# Patient Record
Sex: MALE | Race: OTHER | URBAN - METROPOLITAN AREA
[De-identification: names, ages, dates, MRNs, and addresses within clinical notes are randomized per-mention and may not be internally consistent; named-entity substitution may affect disease eponyms.]

---

## 2024-11-12 ENCOUNTER — HOSPITAL ENCOUNTER (OUTPATIENT)
Dept: PREADMISSION TESTING | Age: 49
Setting detail: OUTPATIENT SURGERY
Discharge: HOME | End: 2024-11-16
Payer: COMMERCIAL

## 2024-11-12 VITALS — BODY MASS INDEX: 46.65 KG/M2 | HEIGHT: 69 IN | WEIGHT: 315 LBS

## 2024-11-12 NOTE — PROGRESS NOTES
area.  When you are alert and stable, you will receive instructions and be prepared for discharge.   Instructions reviewed, patient verbalizes understanding  Bronchoscopy with Lavage 11/14/24

## 2024-11-13 ENCOUNTER — ANESTHESIA EVENT (OUTPATIENT)
Dept: ENDOSCOPY | Age: 49
End: 2024-11-13
Payer: COMMERCIAL

## 2024-11-13 NOTE — PRE-PROCEDURE INSTRUCTIONS
Nothing to eat after midnight. Y  Are you taking any blood thinners? When was the last day?N  Make sure to use Hibiclens prior to surgery.  Remove any jewelry and body piercings.Y  Do you wear glasses? If so, please bring a case to store them in.  Are you having any Covid symptoms?N  Do you have any new rashes, infections, etc. that we should be aware of?N  Do you have a ride home the day of surgery? It cannot be a cab or medical transportation.Y  Verify surgery time and what time to arrive at hospital.[id number]

## 2024-11-14 ENCOUNTER — ANESTHESIA (OUTPATIENT)
Dept: ENDOSCOPY | Age: 49
End: 2024-11-14
Payer: COMMERCIAL

## 2024-11-14 ENCOUNTER — HOSPITAL ENCOUNTER (OUTPATIENT)
Age: 49
Setting detail: OUTPATIENT SURGERY
Discharge: HOME | End: 2024-11-14
Attending: INTERNAL MEDICINE | Admitting: INTERNAL MEDICINE
Payer: COMMERCIAL

## 2024-11-14 VITALS
HEART RATE: 91 BPM | WEIGHT: 315 LBS | OXYGEN SATURATION: 93 % | SYSTOLIC BLOOD PRESSURE: 136 MMHG | DIASTOLIC BLOOD PRESSURE: 92 MMHG | BODY MASS INDEX: 46.65 KG/M2 | TEMPERATURE: 97.8 F | HEIGHT: 69 IN | RESPIRATION RATE: 24 BRPM

## 2024-11-14 DIAGNOSIS — R05.3 CHRONIC COUGH: ICD-10-CM

## 2024-11-14 LAB
COLUMNAR EPIS BAL: ABNORMAL %
EOSINOPHILS BAL: ABNORMAL % (ref 0–1)
LYMPHOCYTES, BAL: 37 % (ref 8–12)
MACROPHAGES, BAL: 39 % (ref 85–95)
RBC, BAL: 625 CELLS/UL
SEGMENTED NEUTROPHILS, BAL: 24 % (ref 0–10)
SPECIMEN TYPE: NORMAL
WBC, BAL: 165 CELLS/UL

## 2024-11-14 PROCEDURE — 87206 SMEAR FLUORESCENT/ACID STAI: CPT

## 2024-11-14 PROCEDURE — 6360000002 HC RX W HCPCS: Performed by: NURSE ANESTHETIST, CERTIFIED REGISTERED

## 2024-11-14 PROCEDURE — 2500000003 HC RX 250 WO HCPCS: Performed by: INTERNAL MEDICINE

## 2024-11-14 PROCEDURE — 6370000000 HC RX 637 (ALT 250 FOR IP): Performed by: INTERNAL MEDICINE

## 2024-11-14 PROCEDURE — 2580000003 HC RX 258: Performed by: ANESTHESIOLOGY

## 2024-11-14 PROCEDURE — 87205 SMEAR GRAM STAIN: CPT

## 2024-11-14 PROCEDURE — 7100000010 HC PHASE II RECOVERY - FIRST 15 MIN: Performed by: INTERNAL MEDICINE

## 2024-11-14 PROCEDURE — 3700000000 HC ANESTHESIA ATTENDED CARE: Performed by: INTERNAL MEDICINE

## 2024-11-14 PROCEDURE — 88312 SPECIAL STAINS GROUP 1: CPT

## 2024-11-14 PROCEDURE — 94761 N-INVAS EAR/PLS OXIMETRY MLT: CPT

## 2024-11-14 PROCEDURE — 6360000002 HC RX W HCPCS: Performed by: INTERNAL MEDICINE

## 2024-11-14 PROCEDURE — 87102 FUNGUS ISOLATION CULTURE: CPT

## 2024-11-14 PROCEDURE — 7100000011 HC PHASE II RECOVERY - ADDTL 15 MIN: Performed by: INTERNAL MEDICINE

## 2024-11-14 PROCEDURE — 88112 CYTOPATH CELL ENHANCE TECH: CPT

## 2024-11-14 PROCEDURE — 87015 SPECIMEN INFECT AGNT CONCNTJ: CPT

## 2024-11-14 PROCEDURE — 2709999900 HC NON-CHARGEABLE SUPPLY: Performed by: INTERNAL MEDICINE

## 2024-11-14 PROCEDURE — 89050 BODY FLUID CELL COUNT: CPT

## 2024-11-14 PROCEDURE — 87116 MYCOBACTERIA CULTURE: CPT

## 2024-11-14 PROCEDURE — 2500000003 HC RX 250 WO HCPCS: Performed by: NURSE ANESTHETIST, CERTIFIED REGISTERED

## 2024-11-14 PROCEDURE — 2500000003 HC RX 250 WO HCPCS: Performed by: ANESTHESIOLOGY

## 2024-11-14 PROCEDURE — 3609027000 HC BRONCHOSCOPY: Performed by: INTERNAL MEDICINE

## 2024-11-14 PROCEDURE — 89051 BODY FLUID CELL COUNT: CPT

## 2024-11-14 PROCEDURE — 3700000001 HC ADD 15 MINUTES (ANESTHESIA): Performed by: INTERNAL MEDICINE

## 2024-11-14 PROCEDURE — 88305 TISSUE EXAM BY PATHOLOGIST: CPT

## 2024-11-14 PROCEDURE — 87070 CULTURE OTHR SPECIMN AEROBIC: CPT

## 2024-11-14 PROCEDURE — 94640 AIRWAY INHALATION TREATMENT: CPT

## 2024-11-14 RX ORDER — SODIUM CHLORIDE 0.9 % (FLUSH) 0.9 %
5-40 SYRINGE (ML) INJECTION PRN
Status: DISCONTINUED | OUTPATIENT
Start: 2024-11-14 | End: 2024-11-14 | Stop reason: HOSPADM

## 2024-11-14 RX ORDER — PROPOFOL 10 MG/ML
INJECTION, EMULSION INTRAVENOUS
Status: DISCONTINUED | OUTPATIENT
Start: 2024-11-14 | End: 2024-11-14 | Stop reason: SDUPTHER

## 2024-11-14 RX ORDER — LIDOCAINE HYDROCHLORIDE 40 MG/ML
4 INJECTION, SOLUTION RETROBULBAR ONCE
Status: COMPLETED | OUTPATIENT
Start: 2024-11-14 | End: 2024-11-14

## 2024-11-14 RX ORDER — KETAMINE HYDROCHLORIDE 50 MG/ML
INJECTION, SOLUTION INTRAMUSCULAR; INTRAVENOUS
Status: DISCONTINUED | OUTPATIENT
Start: 2024-11-14 | End: 2024-11-14 | Stop reason: SDUPTHER

## 2024-11-14 RX ORDER — SODIUM CHLORIDE 0.9 % (FLUSH) 0.9 %
5-40 SYRINGE (ML) INJECTION EVERY 12 HOURS SCHEDULED
Status: DISCONTINUED | OUTPATIENT
Start: 2024-11-14 | End: 2024-11-14 | Stop reason: HOSPADM

## 2024-11-14 RX ORDER — GLYCOPYRROLATE 0.2 MG/ML
INJECTION INTRAMUSCULAR; INTRAVENOUS
Status: DISCONTINUED | OUTPATIENT
Start: 2024-11-14 | End: 2024-11-14 | Stop reason: SDUPTHER

## 2024-11-14 RX ORDER — SODIUM CHLORIDE 9 MG/ML
INJECTION, SOLUTION INTRAVENOUS CONTINUOUS
Status: DISCONTINUED | OUTPATIENT
Start: 2024-11-14 | End: 2024-11-14 | Stop reason: HOSPADM

## 2024-11-14 RX ORDER — LIDOCAINE HYDROCHLORIDE 20 MG/ML
SOLUTION OROPHARYNGEAL PRN
Status: DISCONTINUED | OUTPATIENT
Start: 2024-11-14 | End: 2024-11-14 | Stop reason: ALTCHOICE

## 2024-11-14 RX ORDER — SODIUM CHLORIDE 9 MG/ML
INJECTION, SOLUTION INTRAVENOUS PRN
Status: DISCONTINUED | OUTPATIENT
Start: 2024-11-14 | End: 2024-11-14 | Stop reason: HOSPADM

## 2024-11-14 RX ORDER — MIDAZOLAM HYDROCHLORIDE 1 MG/ML
INJECTION, SOLUTION INTRAMUSCULAR; INTRAVENOUS
Status: DISCONTINUED | OUTPATIENT
Start: 2024-11-14 | End: 2024-11-14 | Stop reason: SDUPTHER

## 2024-11-14 RX ORDER — ALBUTEROL SULFATE 0.83 MG/ML
2.5 SOLUTION RESPIRATORY (INHALATION) ONCE
Status: COMPLETED | OUTPATIENT
Start: 2024-11-14 | End: 2024-11-14

## 2024-11-14 RX ORDER — LIDOCAINE HYDROCHLORIDE 10 MG/ML
1 INJECTION, SOLUTION EPIDURAL; INFILTRATION; INTRACAUDAL; PERINEURAL
Status: COMPLETED | OUTPATIENT
Start: 2024-11-14 | End: 2024-11-14

## 2024-11-14 RX ORDER — LIDOCAINE HYDROCHLORIDE 10 MG/ML
INJECTION, SOLUTION EPIDURAL; INFILTRATION; INTRACAUDAL; PERINEURAL PRN
Status: DISCONTINUED | OUTPATIENT
Start: 2024-11-14 | End: 2024-11-14 | Stop reason: ALTCHOICE

## 2024-11-14 RX ADMIN — LIDOCAINE HYDROCHLORIDE 1 ML: 10 INJECTION, SOLUTION EPIDURAL; INFILTRATION; INTRACAUDAL; PERINEURAL at 11:18

## 2024-11-14 RX ADMIN — KETAMINE HYDROCHLORIDE 50 MG: 50 INJECTION, SOLUTION INTRAMUSCULAR; INTRAVENOUS at 13:33

## 2024-11-14 RX ADMIN — GLYCOPYRROLATE 0.1 MG: 0.2 INJECTION INTRAMUSCULAR; INTRAVENOUS at 13:28

## 2024-11-14 RX ADMIN — SODIUM CHLORIDE: 9 INJECTION, SOLUTION INTRAVENOUS at 13:26

## 2024-11-14 RX ADMIN — ALBUTEROL SULFATE 2.5 MG: 2.5 SOLUTION RESPIRATORY (INHALATION) at 12:05

## 2024-11-14 RX ADMIN — MIDAZOLAM 4 MG: 1 INJECTION INTRAMUSCULAR; INTRAVENOUS at 13:31

## 2024-11-14 RX ADMIN — LIDOCAINE HYDROCHLORIDE 4 ML: 40 INJECTION, SOLUTION RETROBULBAR at 12:05

## 2024-11-14 RX ADMIN — PROPOFOL 40 MG: 10 INJECTION, EMULSION INTRAVENOUS at 13:33

## 2024-11-14 ASSESSMENT — PAIN - FUNCTIONAL ASSESSMENT
PAIN_FUNCTIONAL_ASSESSMENT: 0-10
PAIN_FUNCTIONAL_ASSESSMENT: NONE - DENIES PAIN

## 2024-11-14 ASSESSMENT — ENCOUNTER SYMPTOMS
COUGH: 1
BACK PAIN: 0
CHEST TIGHTNESS: 0
DIARRHEA: 0
VOMITING: 0
ABDOMINAL PAIN: 0
SHORTNESS OF BREATH: 0
SORE THROAT: 0
NAUSEA: 0
CONSTIPATION: 0
SHORTNESS OF BREATH: 1
WHEEZING: 0

## 2024-11-14 NOTE — ANESTHESIA POSTPROCEDURE EVALUATION
Department of Anesthesiology  Postprocedure Note    Patient: Basim Silva  MRN: [id number]  YOB: 1975  Date of evaluation: 11/14/2024    Procedure Summary       Date: 11/14/24 Room / Location: [place] [address] / [facility]    Anesthesia Start: 1328 Anesthesia Stop: 1349    Procedure: BRONCHOSCOPY WITH LAVAGE (Bronchus) Diagnosis:       Chronic cough      (Chronic cough [R05.3])    Surgeons: Avni Olivier MD Responsible Provider: Carlie Trinh MD    Anesthesia Type: General ASA Status: 3            Anesthesia Type: General    Rex Phase I:      Rex Phase II: Rex Score: 10    Anesthesia Post Evaluation    Comments: POST- ANESTHESIA EVALUATION       Pt Name: Basim Silva  MRN: [id number]  YOB: 1975  Date of evaluation: 11/14/2024  Time:  3:01 PM      BP (!) 136/92   Pulse 91   Temp 97.8 °F (36.6 °C) (Infrared)   Resp 24   Ht 1.753 m (5' 9\")   Wt (!) 149.7 kg (330 lb)   SpO2 93%   BMI 48.73 kg/m²      Consciousness Level  Awake  Cardiopulmonary Status  Stable  Pain Adequately Treated YES  Nausea / Vomiting  NO  Adequate Hydration  YES  Anesthesia Related Complications NONE      Electronically signed by Carlie Trinh MD on 11/14/2024 at 3:01 PM      No notable events documented.

## 2024-11-14 NOTE — ANESTHESIA PRE PROCEDURE
Department of Anesthesiology  Preprocedure Note       Name:  Basim Silva   Age:  49 y.o.  :  1975                                          MRN:  [id number]         Date:  2024      Surgeon: Surgeon(s):  Avni Olivier MD    Procedure: Procedure(s):  BRONCHOSCOPY WITH LAVAGE    Medications prior to admission:   Prior to Admission medications    Medication Sig Start Date End Date Taking? Authorizing Provider   dextromethorphan-guaiFENesin (MUCINEX DM)  MG per extended release tablet Take 1 tablet by mouth every 12 hours as needed for Cough or Congestion   Yes Provider, MD Gene       Current medications:    Current Facility-Administered Medications   Medication Dose Route Frequency Provider Last Rate Last Admin   • sodium chloride flush 0.9 % injection 5-40 mL  5-40 mL IntraVENous 2 times per day Heber Galindo MD       • sodium chloride flush 0.9 % injection 5-40 mL  5-40 mL IntraVENous PRN Heber Galindo MD       • 0.9 % sodium chloride infusion   IntraVENous PRN Heber Galindo MD       • 0.9 % sodium chloride infusion   IntraVENous Continuous Heber Galindo MD       • lidocaine PF 4 % injection 4 mL  4 mL Inhalation Once Avni Olivier MD       • albuterol (PROVENTIL) (2.5 MG/3ML) 0.083% nebulizer solution 2.5 mg  2.5 mg Nebulization Once Avni Olivier MD           Allergies:  No Known Allergies    Problem List:  There is no problem list on file for this patient.      Past Medical History:        Diagnosis Date   • Depression    • Fracture     right foot, neck   • H/O varicose veins     right leg   • Injury of back    • Kidney stones    • Left knee pain    • Obesity    • Pneumonia 2024   • Recovering alcoholic (HCC)        Past Surgical History:        Procedure Laterality Date   • HERNIA REPAIR     • KNEE ARTHROSCOPY Left 2019   • SKIN GRAFT      left hand   • VASECTOMY         Social History:    Social History     Tobacco Use   • Smoking status: Never   • Smokeless

## 2024-11-14 NOTE — PROCEDURES
PROCEDURE NOTE  Date: 11/14/2024   Name: Basim Silva  YOB: 1975    Procedures: Bronchoscopy with right middle lobe bronchoalveolar lavage    Bronchoscopy Procedure Note    Date of Operation: 11/14/2024    Pre-op Diagnosis: Recurrent bronchitis, cough    Post-op Diagnosis: Tortuous trachea without any significant secretions, airway inflammation    Surgeon: Avni Olivier MD    Assistants: None    Anesthesia: Monitored Local Anesthesia with Sedation    Operation: Flexible fiberoptic bronchoscopy, diagnostic right middle lobe BAL      Specimen: Right middle lobe BAL    Estimated Blood Loss: None    Complications: None    Indications and History:  The patient is a 49 y.o. male with recurrent episodes of bronchitis and cough.  The risks, benefits, complications, treatment options and expected outcomes were discussed with the patient.  The possibilities of reaction to medication, pulmonary aspiration, perforation of a viscus, bleeding, failure to diagnose a condition and creating a complication requiring transfusion or operation were discussed with the patient who freely signed the consent.      Description of Procedure:  The patient was seen in the Holding Room and the site of surgery properly noted/marked.  The patient was taken to endoscopy suite, identified as Basim Silva and the procedure verified as Flexible Fiberoptic Bronchoscopy.  A Time Out was held and the above information confirmed.     After the induction of topical nasopharyngeal anesthesia, the patient was positioned supine and the bronchoscope was passed through the right nares. The vocal cords were visualized and  1% plain lidocaine 2-3 ml was topically placed onto the cords. The cords were visualized and normal.  The patient did have evidence of sleep apnea with a redundant tissue in the upper airway. The scope was then passed into the trachea.  1% plain lidocaine 2-3 ml was used topically on the serafin.  The trachea was shaped  almost in an oblong fashion and was quite tortuous however, there was no significant secretions.  Careful inspection of the tracheal lumen was accomplished. The scope was sequentially passed into the right mainstem bronchus right upper lobe was visualized and was normal the right middle lobe was slightly higher around 11 and 12 o'clock position but there were no significant secretions.  The right lower lobe also was visualized and there were no secretions.  When the patient coughed, there was evidence of airway inflammation.    We then went to the left lung the left upper division including upper lobe and lingula were visualized.  Again very fragile airways but no significant secretions the left lower lobe was also visualized and was normal.  There were no significant secretions.    There was no endobronchial lesion seen in the either the left or right mainstem bronchus or any of the subsegments.    We then went into the right middle lobe and did 100 mL BAL with approximately 35 ml return.  Patient other than coughing tolerated the procedure very well.    The Patient was taken to the Endoscopy Recovery area in satisfactory condition.    Attestation: I performed the procedure.    Avni Olivier MD

## 2024-11-14 NOTE — DISCHARGE INSTRUCTIONS
Sedation or General Anesthesia, Adult  Care After  Refer to this sheet in the next 24 hours. These instructions provide you with information on caring for yourself after your procedure. Your caregiver may also give you more specific instructions. Your treatment has been planned according to current medical practices, but problems sometimes occur. Call your caregiver if you have any problems or questions after your procedure.   HOME CARE INSTRUCTIONS   Do not participate in any activities that require you to be alert or coordinated. Do not:  Drive.  Swim.  Ride a bicycle.  Operate heavy machinery.  Cook.  Use power tools.  Climb ladders.  Work at heights.  Take a bath.  Do not drink alcohol.  Do not make any important decisions or sign legal documents.  Stay with an adult.  The first meal following your procedure should be light and small. Avoid solid foods if you feel sick to your stomach (nauseous) or if you throw up (vomit).  Drink enough fluids to keep your urine clear or pale yellow.  Only take your usual medicines or new medicines if your caregiver approves them.  Only take over-the-counter or prescription medicines for pain, discomfort, or fever as directed by your caregiver.  Keep all follow-up appointments as directed by your caregiver.  SEEK IMMEDIATE MEDICAL CARE IF:   You are not feeling normal or behaving normally after 24 hours.  You have persistent nausea and vomiting.  You are unable to drink fluids or eat food.  You have difficulty urinating.  You have difficulty breathing or speaking.  You have blue or gray skin.  There is difficulty waking or you cannot be woken up.  You have heavy bleeding, redness, or a lot of swelling where the sedative or anesthesia entered your skin (intravenous site).  You have a rash.  MAKE SURE YOU:  Understand these instructions.  Will watch your condition.  Will get help right away if you are not doing well or get worse.  Document Released: 12/18/2006 Document Revised:  06/18/2013 Document Reviewed: 04/17/2013  [employer]® Patient Information ©2013 [employer].

## 2024-11-14 NOTE — H&P
HISTORY and PHYSICAL  [facility]       NAME:  Basim Silva  MRN: [id number]   YOB: 1975   Date: 11/14/2024   Age: 49 y.o.  Gender: male       COMPLAINT AND PRESENT HISTORY:     Basim Silva is 49 y.o.  male, here for     Procedure(s):  BRONCHOSCOPY WITH LAVAGE    Pre-Op Diagnosis Codes:      * Chronic cough [R05.3]    Patient has hx of respiratory symptoms including: persistent cough that has been ongoing for several months.     Cough has been productive with green sputum. Takes mucinex with good relief.     Denies chest discomfort associated with cough.   Patient has occasional SOB especially with cough but also with exertion.    Pt was diagnosed with pneumonia several times in last several months. Treated with oral antibiotics. Reports completing antibiotics as prescribed.   CT chest completed in October, 2024 with results below.      Denies hx of COPD/emphysema.   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 He did wear respirator then. Reports he grew up in a household with smokers.       NPO p MN. Took no medications this am with sip of water. Denies taking any blood thinning medications. Denies recent or current chest pain/pressure, palpitations, SOB, recent URI, fever or chills.       RECENT LABS, IMAGING AND TESTING     Contains abnormal data CBC auto differential  Order: [id number]  Component  Ref Range & Units 8/6/24 1730   White Blood Cells  4.0 - 11.0 X10E9/L 7.1   RBC count  4.10 - 5.70 X10E12/L 4.79   Hemoglobin  13.0 - 17.0 g/dL 15.5   Hematocrit  39 - 49 % 44.0   MCV  80 - 100 fL 92   MCH  27 - 34 pg 32.4   MCHC  32 - 36 g/dL 35.3   RDW  11.5 - 15.0 % 12.9   Platelets  150 - 450 X10E9/L 284   MPV  7 - 12 fL 8.3   % neutrophils  % 79.4   % lymphocytes  % 10.0   % monocytes  % 9.5   % eosinophils  % 0.2   % Basophils  % 0.9   Neutrophils Absolute (A)  1.5 - 6.6 X10E9/L 5.6  didn't last and we didn't have money to get more.: Never True           REVIEW OF SYSTEMS      No Known Allergies    No current facility-administered medications on file prior to encounter.     Current Outpatient Medications on File Prior to Encounter   Medication Sig Dispense Refill    dextromethorphan-guaiFENesin (MUCINEX DM)  MG per extended release tablet Take 1 tablet by mouth every 12 hours as needed for Cough or Congestion       Notation: Above medications are not currently reconciled at time of signing this H&P note, to be reconciled in pre-op per RN.     Review of Systems   Constitutional:  Negative for chills and fever.   HENT:  Negative for congestion, dental problem, hearing loss and sore throat.    Eyes:  Positive for visual disturbance (glasses, not wearing today).   Respiratory:  Positive for cough and shortness of breath. Negative for chest tightness and wheezing.    Cardiovascular:  Negative for chest pain and palpitations.   Gastrointestinal:  Negative for abdominal pain, constipation, diarrhea, nausea and vomiting.   Genitourinary:  Negative for dysuria and hematuria.   Musculoskeletal:  Positive for neck stiffness. Negative for back pain and neck pain.   Skin:  Negative for rash and wound.   Neurological:  Negative for speech difficulty.   Hematological:  Does not bruise/bleed easily.         GENERAL PHYSICAL EXAM     Vitals: Review vitals per RN flowsheet.                              Physical Exam  Constitutional:       General: He is not in acute distress.     Appearance: He is well-developed. He is obese. He is not ill-appearing.   HENT:      Head: Normocephalic and atraumatic.      Nose: Nose normal.      Mouth/Throat:      Mouth: Mucous membranes are moist.      Pharynx: Oropharynx is clear. No oropharyngeal exudate or posterior oropharyngeal erythema.   Eyes:      General: No scleral icterus.        Right eye: No discharge.         Left eye: No discharge.   Neck:      Trachea: No

## 2024-11-15 LAB
CASE NUMBER:: NORMAL
MICROORGANISM SPEC CULT: NORMAL
MICROORGANISM SPEC CULT: NORMAL
MICROORGANISM/AGENT SPEC: NORMAL
MICROORGANISM/AGENT SPEC: NORMAL
SPECIMEN DESCRIPTION: NORMAL

## 2024-11-16 LAB
MICROORGANISM SPEC CULT: NORMAL
MICROORGANISM/AGENT SPEC: NORMAL
MICROORGANISM/AGENT SPEC: NORMAL
SPECIMEN DESCRIPTION: NORMAL

## 2024-11-18 LAB
MICROORGANISM SPEC CULT: NORMAL
MICROORGANISM SPEC CULT: NORMAL
MICROORGANISM/AGENT SPEC: NORMAL
MICROORGANISM/AGENT SPEC: NORMAL
SPECIMEN DESCRIPTION: NORMAL
SPECIMEN DESCRIPTION: NORMAL
SURGICAL PATHOLOGY REPORT: NORMAL

## 2024-11-25 LAB
MICROORGANISM SPEC CULT: NORMAL
MICROORGANISM/AGENT SPEC: NORMAL
SPECIMEN DESCRIPTION: NORMAL

## 2024-11-26 LAB
MICROORGANISM SPEC CULT: NORMAL
MICROORGANISM/AGENT SPEC: NORMAL
SPECIMEN DESCRIPTION: NORMAL

## 2024-12-02 LAB
MICROORGANISM SPEC CULT: NORMAL
MICROORGANISM SPEC CULT: NORMAL
MICROORGANISM/AGENT SPEC: NORMAL
MICROORGANISM/AGENT SPEC: NORMAL
SPECIMEN DESCRIPTION: NORMAL
SPECIMEN DESCRIPTION: NORMAL

## 2024-12-22 ENCOUNTER — HOSPITAL ENCOUNTER (OUTPATIENT)
Dept: SLEEP CENTER | Age: 49
Discharge: HOME | End: 2024-12-24
Payer: COMMERCIAL

## 2024-12-22 PROCEDURE — 95810 POLYSOM 6/> YRS 4/> PARAM: CPT

## 2024-12-23 VITALS
BODY MASS INDEX: 45.1 KG/M2 | HEIGHT: 70 IN | OXYGEN SATURATION: 92 % | RESPIRATION RATE: 14 BRPM | HEART RATE: 72 BPM | WEIGHT: 315 LBS

## 2024-12-23 LAB
MICROORGANISM SPEC CULT: NORMAL
MICROORGANISM/AGENT SPEC: NORMAL
SPECIMEN DESCRIPTION: NORMAL

## 2024-12-23 ASSESSMENT — SLEEP AND FATIGUE QUESTIONNAIRES
HOW LIKELY ARE YOU TO NOD OFF OR FALL ASLEEP WHILE SITTING INACTIVE IN A PUBLIC PLACE: SLIGHT CHANCE OF DOZING
HOW LIKELY ARE YOU TO NOD OFF OR FALL ASLEEP WHILE LYING DOWN TO REST IN THE AFTERNOON WHEN CIRCUMSTANCES PERMIT: MODERATE CHANCE OF DOZING
ESS TOTAL SCORE: 14
HOW LIKELY ARE YOU TO NOD OFF OR FALL ASLEEP WHEN YOU ARE A PASSENGER IN A CAR FOR AN HOUR WITHOUT A BREAK: HIGH CHANCE OF DOZING
HOW LIKELY ARE YOU TO NOD OFF OR FALL ASLEEP WHILE SITTING QUIETLY AFTER LUNCH WITHOUT ALCOHOL: MODERATE CHANCE OF DOZING
HOW LIKELY ARE YOU TO NOD OFF OR FALL ASLEEP WHILE SITTING AND READING: MODERATE CHANCE OF DOZING
HOW LIKELY ARE YOU TO NOD OFF OR FALL ASLEEP IN A CAR, WHILE STOPPED FOR A FEW MINUTES IN TRAFFIC: SLIGHT CHANCE OF DOZING
HOW LIKELY ARE YOU TO NOD OFF OR FALL ASLEEP WHILE SITTING AND TALKING TO SOMEONE: SLIGHT CHANCE OF DOZING
HOW LIKELY ARE YOU TO NOD OFF OR FALL ASLEEP WHILE WATCHING TV: MODERATE CHANCE OF DOZING

## 2024-12-23 NOTE — PAYOR INFORMATION
Verified Demographics with Patient? No   If no why? Already verified  MSP Completed?  No    If not why? Already completed  Verified Insurance through: Already verified  COB Completed? Not required  Auth Verification #:[id number]  Liability Due: $262.25  Discount Offered: 235.41      Previous Balance: $ 262.25   Discount Offered: 20%  Site Collect Status: yes  Patient Response:pt tried to pay in full but system was not working  Financial Aid Offered? Yes Pt declined  Cards Scanned: yes

## 2024-12-30 LAB
MICROORGANISM SPEC CULT: NORMAL
MICROORGANISM/AGENT SPEC: NORMAL
SPECIMEN DESCRIPTION: NORMAL

## 2025-02-04 ENCOUNTER — HOSPITAL ENCOUNTER (OUTPATIENT)
Dept: PULMONOLOGY | Age: 50
Discharge: HOME | End: 2025-02-04
Attending: INTERNAL MEDICINE
Payer: COMMERCIAL

## 2025-02-04 DIAGNOSIS — J45.991 COUGH VARIANT ASTHMA: ICD-10-CM

## 2025-02-04 LAB
EXPIRATORY TIME-POST: NORMAL
EXPIRATORY TIME: NORMAL
FEF 25-75 %CHNG: NORMAL
FEF 25-75 POST %PRED: NORMAL
FEF 25-75% %PRED-PRE: NORMAL
FEF 25-75% PRED: NORMAL
FEF 25-75-POST: NORMAL
FEF 25-75-PRE: NORMAL
FEV1 %PRED-POST: NORMAL
FEV1 %PRED-PRE: NORMAL
FEV1 PRED: NORMAL
FEV1-POST: NORMAL
FEV1-PRE: NORMAL
FEV1/FVC %PRED-POST: NORMAL
FEV1/FVC %PRED-PRE: NORMAL
FEV1/FVC PRED: NORMAL
FEV1/FVC-POST: NORMAL
FEV1/FVC-PRE: NORMAL
FVC %PRED-POST: NORMAL
FVC %PRED-PRE: NORMAL
FVC PRED: NORMAL
FVC-POST: NORMAL
FVC-PRE: NORMAL
PEF %PRED-POST: NORMAL
PEF %PRED-PRE: NORMAL
PEF PRED: NORMAL
PEF%CHNG: NORMAL
PEF-POST: NORMAL
PEF-PRE: NORMAL

## 2025-02-04 PROCEDURE — 94070 EVALUATION OF WHEEZING: CPT

## 2025-02-04 PROCEDURE — 94060 EVALUATION OF WHEEZING: CPT

## 2025-02-04 PROCEDURE — 6360000002 HC RX W HCPCS: Performed by: INTERNAL MEDICINE

## 2025-02-04 RX ORDER — ALBUTEROL SULFATE 0.83 MG/ML
2.5 SOLUTION RESPIRATORY (INHALATION) ONCE
Status: COMPLETED | OUTPATIENT
Start: 2025-02-04 | End: 2025-02-04

## 2025-02-04 RX ADMIN — METHACHOLINE CHLORIDE 100 MG: 100 POWDER, FOR SOLUTION RESPIRATORY (INHALATION) at 11:42

## 2025-02-04 RX ADMIN — ALBUTEROL SULFATE 2.5 MG: 2.5 SOLUTION RESPIRATORY (INHALATION) at 12:05

## 2025-02-04 NOTE — PROCEDURES
Results: The FVC and FEV1 are normal premethacholine.  Methacholine was given in incremental fashion per protocol.  Even at level 5, there is no significant drop in the FEV1.    Impression: This is a negative methacholine challenge test effectively excluding obstructive lung disease as a cause of the patient's dyspnea

## 2025-02-04 NOTE — PROGRESS NOTES
BRONCHIAL / METHACHOLINE CHALLENGE CONTRAINDICATIONS REVIEWED WITH PATIENT PRIOR TO TESTING     Pt states they have none of the following ABSOLUTE CONTRAINDICATIONS    [x]  Known hypersensitivity to methacholine or other parasympathomimetic agents   [x]  FEV 1 of less than 50% normal predicted level or less than 1L (can be determined on baseline testing)   [x]  Myocardial infarction or stroke within last 3 months  [x]  Uncontrolled Hypertension (systolic BP>200 mmHg or diastolic BP>100 mmHg)   - 133/88 : current patient BP  [x]  Known or suspected aortic aneurism  [x]  Recent Eye surgery in which raised intracranial pressure would be harmful           Pt states they have none of the following RELATIVE CONTRAINDICATIONS    [x]   Moderate airflow limitation (reduced FEV1/forced vital capacity ratio) and FEV1 of less than 60% of the predicted normal value, or less than 1.5L. (can be determined on baseline testing)   [x]   Inability to perform acceptable or repeatable spirometry at baseline   [x]   Upper or lower respiratory tract infection   [x]   Current use of beta-adrenic blocking agent         VITAS PRIOR TO TEST     SpO2: 99  HR: 81  BP: 133/88  RR: 18  Work of Breathing: non labored   Breathsounds: clear / diminished

## 2025-04-08 ENCOUNTER — HOSPITAL ENCOUNTER (EMERGENCY)
Age: 50
Discharge: HOME | End: 2025-04-08
Attending: EMERGENCY MEDICINE
Payer: COMMERCIAL

## 2025-04-08 ENCOUNTER — APPOINTMENT (OUTPATIENT)
Dept: GENERAL RADIOLOGY | Age: 50
End: 2025-04-08
Payer: COMMERCIAL

## 2025-04-08 VITALS
RESPIRATION RATE: 18 BRPM | HEART RATE: 85 BPM | HEIGHT: 69 IN | DIASTOLIC BLOOD PRESSURE: 87 MMHG | WEIGHT: 315 LBS | OXYGEN SATURATION: 96 % | TEMPERATURE: 98.3 F | BODY MASS INDEX: 46.65 KG/M2 | SYSTOLIC BLOOD PRESSURE: 152 MMHG

## 2025-04-08 DIAGNOSIS — M25.561 ACUTE PAIN OF RIGHT KNEE: Primary | ICD-10-CM

## 2025-04-08 PROCEDURE — 73562 X-RAY EXAM OF KNEE 3: CPT

## 2025-04-08 PROCEDURE — 99283 EMERGENCY DEPT VISIT LOW MDM: CPT

## 2025-04-08 ASSESSMENT — PAIN DESCRIPTION - ORIENTATION: ORIENTATION: RIGHT

## 2025-04-08 ASSESSMENT — PAIN SCALES - GENERAL: PAINLEVEL_OUTOF10: 6

## 2025-04-08 ASSESSMENT — LIFESTYLE VARIABLES
HOW MANY STANDARD DRINKS CONTAINING ALCOHOL DO YOU HAVE ON A TYPICAL DAY: PATIENT DOES NOT DRINK
HOW OFTEN DO YOU HAVE A DRINK CONTAINING ALCOHOL: NEVER

## 2025-04-08 ASSESSMENT — PAIN - FUNCTIONAL ASSESSMENT
PAIN_FUNCTIONAL_ASSESSMENT: PREVENTS OR INTERFERES SOME ACTIVE ACTIVITIES AND ADLS
PAIN_FUNCTIONAL_ASSESSMENT: 0-10

## 2025-04-08 ASSESSMENT — PAIN DESCRIPTION - DESCRIPTORS: DESCRIPTORS: TENDER;DISCOMFORT

## 2025-04-08 ASSESSMENT — PAIN DESCRIPTION - LOCATION: LOCATION: KNEE

## 2025-04-08 NOTE — DISCHARGE INSTRUCTIONS
Home.  Tylenol 650 mg and ibuprofen 600 mg together in conjunction every 8 hours to help with pain.  Rest ice elevate.  Use crutches to help with walking.  Wear the knee brace for support but take it out of the brace when you are walking.  Follow-up with orthopedics tomorrow at 8:30 AM.  Return to the emergency department for any worsening symptoms, redness warmth swelling, pain not controlled or any other concerns

## 2025-04-09 ENCOUNTER — OFFICE VISIT (OUTPATIENT)
Dept: ORTHOPEDIC SURGERY | Age: 50
End: 2025-04-09
Payer: COMMERCIAL

## 2025-04-09 VITALS — HEIGHT: 69 IN | WEIGHT: 315 LBS | RESPIRATION RATE: 14 BRPM | BODY MASS INDEX: 46.65 KG/M2

## 2025-04-09 DIAGNOSIS — M25.561 RIGHT KNEE PAIN, UNSPECIFIED CHRONICITY: Primary | ICD-10-CM

## 2025-04-09 PROCEDURE — G8427 DOCREV CUR MEDS BY ELIG CLIN: HCPCS

## 2025-04-09 PROCEDURE — 1036F TOBACCO NON-USER: CPT

## 2025-04-09 PROCEDURE — 99204 OFFICE O/P NEW MOD 45 MIN: CPT

## 2025-04-09 PROCEDURE — G8417 CALC BMI ABV UP PARAM F/U: HCPCS

## 2025-04-09 RX ORDER — DICLOFENAC SODIUM 75 MG/1
75 TABLET, DELAYED RELEASE ORAL 2 TIMES DAILY WITH MEALS
Qty: 28 TABLET | Refills: 0 | Status: SHIPPED | OUTPATIENT
Start: 2025-04-09 | End: 2025-04-23

## 2025-04-09 NOTE — PROGRESS NOTES
Orthopedic Knee Encounter Note - New Patient; ED Follow-Up    Chief complaint: Right knee pain    HPI: Basim Silva is a 49 y.o. male who presents for evaluation of right knee pain.  Patient states that yesterday he was doing a lower body workout and denies any injury or trauma at that time.  States that he then later presented to work and went to stand up out of his chair and was having trouble weightbearing due to the pain.  He states that the pain is mostly localized to the lateral aspect of his knee without feelings of instability, catching/popping or clicking within the knee.  States that he does have a history of a meniscectomy within this knee about 5 years ago.  Denies any recent injury or trauma.  Has been ambulating with crutches since his ED visit and presents today for further evaluation and treatment recommendations.  Denies any numbness or tingling down the leg.  Denies any radiation of the pain outside of the lateral aspect of his knee.  He does state that he is improving in terms of pain from what it was yesterday.    Previous treatment:    NSAIDs: Ibuprofen    Injections: None    Physical therapy: None    Surgeries: History of meniscectomy to the right knee 5 years ago.    Review of Systems:     Constitution: no fever or chills   Pain level: 6/10  Musculoskeletal: As noted in the HPI   Neurologic: no neurologic symptoms    Past Medical History  Basim  has a past medical history of Depression, Fracture, H/O varicose veins, Injury of back, Kidney stones, Left knee pain, Obesity, Pneumonia, and Recovering alcoholic (HCC).    Past Surgical History  Basim  has a past surgical history that includes Vasectomy (2009); Skin graft (1997); Knee arthroscopy (Left, 2019); hernia repair; and bronchoscopy (N/A, 11/14/2024).    Current Medications  Current Outpatient Medications   Medication Sig Dispense Refill    dextromethorphan-guaiFENesin (MUCINEX DM)  MG per extended release tablet Take 1 tablet by

## 2025-04-09 NOTE — ED PROVIDER NOTES
Orthopedics and Sports Medicine  [address] [address]  [phone]  On 4/9/2025  0830      DISCHARGE MEDICATIONS:  Discharge Medication List as of 4/8/2025  3:42 PM          (Please note that portions of this note were completed with a voice recognition program.  Efforts were made to edit the dictations but occasionally words are mis-transcribed.)    Ian Rush DO, DO  Attending Emergency Physician       Ian Rush DO  04/08/25 2053    
Monitoring:          No data to display                (Please note that portions of this note were completed with a voice recognition program.  Efforts were made to edit the dictations but occasionally words are mis-transcribed.)    TARA Branch - CNP (electronically signed)             Katya Lovell, TARA - CNP  04/08/25 1548

## (undated) DEVICE — SINGLE USE SUCTION VALVE MAJ-209: Brand: SINGLE USE SUCTION VALVE (STERILE)

## (undated) DEVICE — SINGLE USE BIOPSY VALVE MAJ-210: Brand: SINGLE USE BIOPSY VALVE (STERILE)

## (undated) DEVICE — TUBING, SUCTION, 3/16" X 10', STRAIGHT: Brand: MEDLINE

## (undated) DEVICE — BITEBLOCK 54FR W/ DENT RIM BLOX

## (undated) DEVICE — ST CHARLES BRONCHOSCOPY PACK: Brand: MEDLINE INDUSTRIES, INC.

## (undated) DEVICE — YANKAUER,BULB TIP,W/O VENT,RIGID,STERILE: Brand: MEDLINE

## (undated) DEVICE — JELLY,LUBE,STERILE,FLIP TOP,TUBE,2-OZ: Brand: MEDLINE

## (undated) DEVICE — SYRINGE MED 50ML LUERSLIP TIP

## (undated) DEVICE — GLOVE ORANGE PI 7   MSG9070